# Patient Record
Sex: MALE | Race: WHITE | Employment: OTHER | URBAN - METROPOLITAN AREA
[De-identification: names, ages, dates, MRNs, and addresses within clinical notes are randomized per-mention and may not be internally consistent; named-entity substitution may affect disease eponyms.]

---

## 2018-08-23 ENCOUNTER — TELEPHONE (OUTPATIENT)
Dept: INFECTIOUS DISEASES | Age: 68
End: 2018-08-23

## 2019-06-04 ENCOUNTER — INITIAL CONSULT (OUTPATIENT)
Dept: PHYSICAL MEDICINE AND REHAB | Age: 69
End: 2019-06-04
Payer: MEDICARE

## 2019-06-04 VITALS
HEIGHT: 71 IN | HEART RATE: 75 BPM | DIASTOLIC BLOOD PRESSURE: 46 MMHG | WEIGHT: 212 LBS | BODY MASS INDEX: 29.68 KG/M2 | TEMPERATURE: 96 F | SYSTOLIC BLOOD PRESSURE: 129 MMHG

## 2019-06-04 DIAGNOSIS — S88.112A AMPUTATION OF LEFT LOWER EXTREMITY BELOW KNEE (HCC): Primary | ICD-10-CM

## 2019-06-04 PROCEDURE — 4040F PNEUMOC VAC/ADMIN/RCVD: CPT | Performed by: PHYSICAL MEDICINE & REHABILITATION

## 2019-06-04 PROCEDURE — 3017F COLORECTAL CA SCREEN DOC REV: CPT | Performed by: PHYSICAL MEDICINE & REHABILITATION

## 2019-06-04 PROCEDURE — G8419 CALC BMI OUT NRM PARAM NOF/U: HCPCS | Performed by: PHYSICAL MEDICINE & REHABILITATION

## 2019-06-04 PROCEDURE — G8427 DOCREV CUR MEDS BY ELIG CLIN: HCPCS | Performed by: PHYSICAL MEDICINE & REHABILITATION

## 2019-06-04 PROCEDURE — 1123F ACP DISCUSS/DSCN MKR DOCD: CPT | Performed by: PHYSICAL MEDICINE & REHABILITATION

## 2019-06-04 PROCEDURE — 1036F TOBACCO NON-USER: CPT | Performed by: PHYSICAL MEDICINE & REHABILITATION

## 2019-06-04 PROCEDURE — 99213 OFFICE O/P EST LOW 20 MIN: CPT | Performed by: PHYSICAL MEDICINE & REHABILITATION

## 2019-06-04 RX ORDER — FLUOXETINE 20 MG/1
20 TABLET, FILM COATED ORAL
COMMUNITY

## 2019-06-04 RX ORDER — COLLAGENASE SANTYL 250 [ARB'U]/G
OINTMENT TOPICAL
Refills: 1 | COMMUNITY
Start: 2019-05-30

## 2019-06-04 RX ORDER — TAMSULOSIN HYDROCHLORIDE 0.4 MG/1
0.4 CAPSULE ORAL
COMMUNITY

## 2019-06-04 RX ORDER — FERROUS SULFATE 325(65) MG
325 TABLET ORAL
COMMUNITY

## 2019-06-04 RX ORDER — WARFARIN SODIUM 3 MG/1
3 TABLET ORAL
COMMUNITY
Start: 2019-02-16

## 2019-06-04 RX ORDER — NICOTINE POLACRILEX 4 MG/1
20 GUM, CHEWING ORAL
COMMUNITY

## 2019-06-04 RX ORDER — ATORVASTATIN CALCIUM 40 MG/1
40 TABLET, FILM COATED ORAL
COMMUNITY

## 2019-06-04 RX ORDER — WARFARIN SODIUM 2.5 MG/1
2.5 TABLET ORAL
COMMUNITY
Start: 2019-02-16 | End: 2019-06-04

## 2019-06-04 RX ORDER — BUMETANIDE 2 MG/1
1 TABLET ORAL
COMMUNITY

## 2019-06-04 RX ORDER — PHENOL 1.4 %
600 AEROSOL, SPRAY (ML) MUCOUS MEMBRANE
COMMUNITY

## 2019-06-04 RX ORDER — CLOPIDOGREL BISULFATE 75 MG/1
75 TABLET ORAL
COMMUNITY

## 2019-06-04 RX ORDER — CARVEDILOL 6.25 MG/1
6.25 TABLET ORAL
COMMUNITY

## 2019-06-04 RX ORDER — SPIRONOLACTONE 25 MG/1
12.5 TABLET ORAL
COMMUNITY
Start: 2018-02-27

## 2019-06-04 NOTE — PROGRESS NOTES
[facility] PHYSICIANS  [facility] PHYSICAL MEDICINE & REHABILITATION  [address] [place] [address]  Dept: [phone]  Dept Fax: [phone]      Opal Camacho, 71 y.o., male, is c/o of Leg amputation (Left BKA )  . HPI:     Mr. Milka Valenzuela has a history of left Unilateral Lower Limb Below the Knee (BK) due to non-healing diabetic wound and PVD. This took place on August 2018. He does currently have a prosthesis. Any problems with current prosthesis? yes  He usesa cane to ambulate. He hasparticipated in physical therapy for training to use prosthesis. The patient is able to don and doff the prosthesis with noassistance. He does not require assistance at home. Areas of pain or weakness: Denies any issues with pain in residual limb or phantom limb pain. He has occasional phantom limb sensation. Patient would like to be able to perform ADLs independently, attend appointments, ride a motorcycle and hunt, visit with family and friends, cook and clean independently. Discussed with patient the importance of caring for/protecting his R leg. He has history of recurrent staph infection in R TKA. He currently has small open wound dorsal aspect R great toe from hitting it on his scooter when getting out of bed. Podiatry is following and treating with dry dressing. He has on fracture shoe R foot.     Past Medical History:   Diagnosis Date    Acute systolic heart failure (HCC)     Acute systolic heart failure w/an EF of around 20%    AICD (automatic cardioverter/defibrillator) present     VEENA (acute kidney injury) ([address]     CAD (coronary artery disease)     Cerebral artery occlusion with cerebral infarction ([address]     CHF (congestive heart failure) ([address]     Diabetes mellitus ([address]     Epistaxis     Fall     Hx of blood clots     Hyperlipidemia     Hypertension     MSSA (methicillin susceptible Staphylococcus aureus)     MSSA UTI     UTI    Non-STEMI (non-ST elevated myocardial infarction) [facility])     Prosthetic heart valve clot, initial encounter     Respiratory failure ([address]     Sepsis [facility]     Septic arthritis of knee, right ([facility]     Septic arthritis of shoulder, right ([facility]     Septic shock ([facility]       Past Surgical History:   Procedure Laterality Date    CARDIAC CATHETERIZATION  2016    2 stents    JOINT REPLACEMENT      staph infection right knee, drained fluid    PACEMAKER PLACEMENT  2016     Family History   Problem Relation Age of Onset    Diabetes Mother     Stroke Father      Social History     Socioeconomic History    Marital status: Single     Spouse name: None    Number of children: None    Years of education: None    Highest education level: None   Occupational History    None   Social Needs    Financial resource strain: None    Food insecurity:     Worry: None     Inability: None    Transportation needs:     Medical: None     Non-medical: None   Tobacco Use    Smoking status: Former Smoker     Last attempt to quit: 1979     Years since quittin.4    Smokeless tobacco: Never Used   Substance and Sexual Activity    Alcohol use: No    Drug use: No    Sexual activity: Never   Lifestyle    Physical activity:     Days per week: None     Minutes per session: None    Stress: None   Relationships    Social connections:     Talks on phone: None     Gets together: None     Attends [employer] service: None     Active member of club or organization: None     Attends meetings of clubs or organizations: None     Relationship status: None    Intimate partner violence:     Fear of current or ex partner: None     Emotionally abused: None     Physically abused: None     Forced sexual activity: None   Other Topics Concern    None   Social History Narrative    None       Current Outpatient Medications   Medication Sig Dispense Refill    FLUoxetine (PROZAC) 20 MG tablet Take 20 mg by mouth      omeprazole 20 MG EC tablet Take 20 mg by mouth      ferrous sulfate 325 (65 Fe) MG tablet Take 325 mg by mouth      Cholecalciferol 5000 UNIT/ML LIQD Take 50,000 Units by mouth      calcium carbonate 600 MG TABS tablet Take 600 mg by mouth      aspirin 81 MG tablet Take 81 mg by mouth      bumetanide (BUMEX) 2 MG tablet Take 4 mg by mouth      clopidogrel (PLAVIX) 75 MG tablet Take 75 mg by mouth      SANTYL 250 UNIT/GM ointment APPLY OINTMENT TOPICALLY TO AFFECTED AREA ONCE DAILY AS DIRECTED  1    atorvastatin (LIPITOR) 40 MG tablet Take 40 mg by mouth      carvedilol (COREG) 6.25 MG tablet Take 6.25 mg by mouth      insulin NPH (HUMULIN N) 100 UNIT/ML injection vial Inject 15 Units into the skin      spironolactone (ALDACTONE) 25 MG tablet Take 25 mg by mouth      tamsulosin (FLOMAX) 0.4 MG capsule Take 0.4 mg by mouth      warfarin (COUMADIN) 3 MG tablet Take 3 mg by mouth      furosemide (LASIX) 20 MG tablet Take 20 mg by mouth 2 times daily      warfarin (COUMADIN) 5 MG tablet Take 5 mg by mouth      Multiple Vitamins-Minerals (THERAPEUTIC MULTIVITAMIN-MINERALS) tablet Take 1 tablet by mouth daily       No current facility-administered medications for this visit. Allergies   Allergen Reactions    Amoxicillin-Pot Clavulanate Other (See Comments)     Nausea, vomiting, diarrhea. Patient tolerated 10 days of Cefazolin therapy from 11/30-12/20/2016       Subjective:     Review of Systems  Constitutional: Negative for fever, chills and unexpected weight change. HEENT: Negative for trouble swallowing. Negative for recent vision changes  Respiratory: Negative for cough and shortness of breath. Cardiovascular: Negative for chest pain or SOB. Endocrine: Negative for polyuria. Genitourinary: Negative for dysuria, urgency,frequency and difficulty urinating. Musculoskeletal: Negative for back pain. R knee aching and limited ROM. Neurological: Negative for headaches. Negative for paresthesias.    Dermatologic: Negative for rash, ulcers, or to accommodate for any volume fluctuations. Patient will benefit from flex foot system with multi axial rotation to allow the patient to ambulate at variable shira and on uneven terrain with increased stability and allow the patient to more easily complete his exercise regimen. 3. The patient has the ability and potential to be a K3 ambulator. 4. Educated patient on risk of riding motorcycle especially while on blood thinner and given the importance of protecting his intact R leg. 5. Educated him on full core strengthening exercises and to add balance training (i.e. With foam pad) and gluteal strengthening/hip extension to his home exercises. No orders of the defined types were placed in this encounter. No orders of the defined types were placed in this encounter. Return if symptoms worsen or fail to improve. Electronically signed by Josiah Neil MD on 6/4/2019 at 4:10 PM.     Please note that this chartwas generated using voice recognition Dragon dictation software. Although everyeffort was made to ensure the accuracy of this automated transcription, some errorsin transcription may have occurred.

## 2019-11-12 ENCOUNTER — OFFICE VISIT (OUTPATIENT)
Dept: PHYSICAL MEDICINE AND REHAB | Age: 69
End: 2019-11-12
Payer: MEDICARE

## 2019-11-12 VITALS
WEIGHT: 200.6 LBS | HEIGHT: 69 IN | BODY MASS INDEX: 29.71 KG/M2 | TEMPERATURE: 96 F | DIASTOLIC BLOOD PRESSURE: 65 MMHG | HEART RATE: 65 BPM | SYSTOLIC BLOOD PRESSURE: 134 MMHG

## 2019-11-12 DIAGNOSIS — S88.112A AMPUTATION OF LEFT LOWER EXTREMITY BELOW KNEE (HCC): Primary | ICD-10-CM

## 2019-11-12 PROCEDURE — G8484 FLU IMMUNIZE NO ADMIN: HCPCS | Performed by: PHYSICAL MEDICINE & REHABILITATION

## 2019-11-12 PROCEDURE — G8417 CALC BMI ABV UP PARAM F/U: HCPCS | Performed by: PHYSICAL MEDICINE & REHABILITATION

## 2019-11-12 PROCEDURE — 3017F COLORECTAL CA SCREEN DOC REV: CPT | Performed by: PHYSICAL MEDICINE & REHABILITATION

## 2019-11-12 PROCEDURE — G8427 DOCREV CUR MEDS BY ELIG CLIN: HCPCS | Performed by: PHYSICAL MEDICINE & REHABILITATION

## 2019-11-12 PROCEDURE — 4040F PNEUMOC VAC/ADMIN/RCVD: CPT | Performed by: PHYSICAL MEDICINE & REHABILITATION

## 2019-11-12 PROCEDURE — 1036F TOBACCO NON-USER: CPT | Performed by: PHYSICAL MEDICINE & REHABILITATION

## 2019-11-12 PROCEDURE — 99213 OFFICE O/P EST LOW 20 MIN: CPT | Performed by: PHYSICAL MEDICINE & REHABILITATION

## 2019-11-12 PROCEDURE — 1123F ACP DISCUSS/DSCN MKR DOCD: CPT | Performed by: PHYSICAL MEDICINE & REHABILITATION

## 2019-11-12 RX ORDER — EZETIMIBE 10 MG/1
10 TABLET ORAL DAILY
COMMUNITY

## 2023-04-26 ENCOUNTER — OFFICE VISIT (OUTPATIENT)
Dept: PHYSICAL MEDICINE AND REHAB | Age: 73
End: 2023-04-26
Payer: MEDICARE

## 2023-04-26 VITALS
TEMPERATURE: 97.6 F | HEART RATE: 53 BPM | DIASTOLIC BLOOD PRESSURE: 76 MMHG | HEIGHT: 69 IN | BODY MASS INDEX: 28.68 KG/M2 | WEIGHT: 193.6 LBS | SYSTOLIC BLOOD PRESSURE: 124 MMHG

## 2023-04-26 DIAGNOSIS — Z89.512 S/P UNILATERAL BKA (BELOW KNEE AMPUTATION), LEFT (HCC): Primary | ICD-10-CM

## 2023-04-26 PROCEDURE — 99203 OFFICE O/P NEW LOW 30 MIN: CPT | Performed by: STUDENT IN AN ORGANIZED HEALTH CARE EDUCATION/TRAINING PROGRAM

## 2023-04-26 PROCEDURE — G8419 CALC BMI OUT NRM PARAM NOF/U: HCPCS | Performed by: STUDENT IN AN ORGANIZED HEALTH CARE EDUCATION/TRAINING PROGRAM

## 2023-04-26 PROCEDURE — 3017F COLORECTAL CA SCREEN DOC REV: CPT | Performed by: STUDENT IN AN ORGANIZED HEALTH CARE EDUCATION/TRAINING PROGRAM

## 2023-04-26 PROCEDURE — 1123F ACP DISCUSS/DSCN MKR DOCD: CPT | Performed by: STUDENT IN AN ORGANIZED HEALTH CARE EDUCATION/TRAINING PROGRAM

## 2023-04-26 PROCEDURE — 4004F PT TOBACCO SCREEN RCVD TLK: CPT | Performed by: STUDENT IN AN ORGANIZED HEALTH CARE EDUCATION/TRAINING PROGRAM

## 2023-04-26 PROCEDURE — G8427 DOCREV CUR MEDS BY ELIG CLIN: HCPCS | Performed by: STUDENT IN AN ORGANIZED HEALTH CARE EDUCATION/TRAINING PROGRAM

## 2023-04-26 RX ORDER — LISINOPRIL 2.5 MG/1
2.5 TABLET ORAL EVERY MORNING
COMMUNITY
Start: 2023-03-23

## 2023-04-26 RX ORDER — ORAL SEMAGLUTIDE 7 MG/1
7 TABLET ORAL DAILY
COMMUNITY
Start: 2023-03-14

## 2023-04-26 ASSESSMENT — ENCOUNTER SYMPTOMS: COLOR CHANGE: 0

## 2023-04-26 NOTE — PROGRESS NOTES
the left residual limb. No erythema or wounds noted on the left residual limb. Psychiatric: He has a normal mood and affect. His behavior is normal.  Thought content normal.   MSK: Left BKA. Strength 5/5 in the bilateral lower limbs. Gait: Ambulates with left lower limb prosthesis and no assistive device; right foot turned outward slightly with walking. Nursing note and vitals reviewed. Reviewed notes from prosthetist.      Assessment:       Diagnosis Orders   1. S/P unilateral BKA (below knee amputation), left ([address]               Plan:      1. Mr. Brian Adams is a previously high functioning individual.  He is highly motivated and would like to continue ambulating and completing ADLs independently. He enjoys taking his classic car to car shows, woodworking, and going to the gym. 2. After review with the prosthetist, I agree this patient would benefit from a new total contact, acrylic, locking socket with a lightweight carbon fiber frame and flexible insert. He would also benefit from 2 locking liners and multi-ply prosthetic socks. 3. The patient has the ability and potential to be a K3 ambulator - the patient has the ability or potential for ambulation with variable shira typical of the community ambulator who has the ability to traverse most environmental barriers and may have vocational, therapeutic, or exercise activity that demands prosthetic utilization beyond simple locomotion.   4. Follow up in this clinic as needed      Electronically signed by Domingo Melara MD on 4/26/2023 at 5:40 PM.